# Patient Record
Sex: FEMALE | Race: WHITE | Employment: FULL TIME | URBAN - METROPOLITAN AREA
[De-identification: names, ages, dates, MRNs, and addresses within clinical notes are randomized per-mention and may not be internally consistent; named-entity substitution may affect disease eponyms.]

---

## 2021-01-25 ENCOUNTER — THERAPY VISIT (OUTPATIENT)
Dept: PHYSICAL THERAPY | Facility: CLINIC | Age: 31
End: 2021-01-25
Payer: COMMERCIAL

## 2021-01-25 DIAGNOSIS — M76.32 ILIOTIBIAL BAND SYNDROME, LEFT: ICD-10-CM

## 2021-01-25 DIAGNOSIS — M25.551 HIP PAIN, RIGHT: ICD-10-CM

## 2021-01-25 DIAGNOSIS — M76.31 ILIOTIBIAL BAND SYNDROME, RIGHT: ICD-10-CM

## 2021-01-25 PROCEDURE — 97161 PT EVAL LOW COMPLEX 20 MIN: CPT | Mod: GP | Performed by: PHYSICAL THERAPIST

## 2021-01-25 PROCEDURE — 97110 THERAPEUTIC EXERCISES: CPT | Mod: GP | Performed by: PHYSICAL THERAPIST

## 2021-01-25 ASSESSMENT — ACTIVITIES OF DAILY LIVING (ADL)
STEPPING_UP_AND_DOWN_CURBS: NO DIFFICULTY AT ALL
GOING_UP_1_FLIGHT_OF_STAIRS: NO DIFFICULTY AT ALL
HOS_ADL_ITEM_SCORE_TOTAL: 66
WALKING_APPROXIMATELY_10_MINUTES: NO DIFFICULTY AT ALL
WALKING_DOWN_STEEP_HILLS: NO DIFFICULTY AT ALL
GETTING_INTO_AND_OUT_OF_A_BATHTUB: NO DIFFICULTY AT ALL
WALKING_UP_STEEP_HILLS: NO DIFFICULTY AT ALL
ROLLING_OVER_IN_BED: NO DIFFICULTY AT ALL
HEAVY_WORK: NO DIFFICULTY AT ALL
SITTING_FOR_15_MINUTES: NO DIFFICULTY AT ALL
WALKING_15_MINUTES_OR_GREATER: NO DIFFICULTY AT ALL
TWISTING/PIVOTING_ON_INVOLVED_LEG: MODERATE DIFFICULTY
LIGHT_TO_MODERATE_WORK: NO DIFFICULTY AT ALL
WALKING_INITIALLY: NO DIFFICULTY AT ALL
HOS_ADL_COUNT: 17
GETTING_INTO_AND_OUT_OF_AN_AVERAGE_CAR: NO DIFFICULTY AT ALL
HOS_ADL_SCORE(%): 97.06
GOING_DOWN_1_FLIGHT_OF_STAIRS: NO DIFFICULTY AT ALL
RECREATIONAL_ACTIVITIES: NO DIFFICULTY AT ALL
HOS_ADL_HIGHEST_POTENTIAL_SCORE: 68
DEEP_SQUATTING: NO DIFFICULTY AT ALL
HOW_WOULD_YOU_RATE_YOUR_CURRENT_LEVEL_OF_FUNCTION_DURING_YOUR_USUAL_ACTIVITIES_OF_DAILY_LIVING_FROM_0_TO_100_WITH_100_BEING_YOUR_LEVEL_OF_FUNCTION_PRIOR_TO_YOUR_HIP_PROBLEM_AND_0_BEING_THE_INABILITY_TO_PERFORM_ANY_OF_YOUR_USUAL_DAILY_ACTIVITIES?: 100
PUTTING_ON_SOCKS_AND_SHOES: NO DIFFICULTY AT ALL
STANDING_FOR_15_MINUTES: NO DIFFICULTY AT ALL

## 2021-01-25 NOTE — LETTER
[facility] PH[facility]ICAL [facility]  [address] [facility]. [address]  [facility] [id number] [phone]    2021    Re: Erlinda Villegas   :   1990  MRN:  [id number]   REFERRING PHYSICIAN:   Tere Lau    [facility] [place] of Initial Evaluation:  2021  Visits:  Rxs Used: 1  Reason for Referral:     Hip pain, right  Iliotibial band syndrome, right  Iliotibial band syndrome, left    EVALUATION SUMMARY    [facility] Initial Evaluation  Subjective:  The history is provided by the patient. No  was used.   Patient Health History  Erlinda Villegas being seen for B hip/knee/leg pain.   Date of Onset: 21 MD order for PT.   Problem occurred: reports onset of B hip/knee pain on/off for 4 yrs. She c/o increased R>L posterior/lateral knee/ anterior/lat hip pain in 2020(maybe related to decreased exercise) Currently c/o R posterior/lateral knee pain and anterior lateral hip pain.( L leg much better in past 2 wks- no pain today) Pain worsens with prolonged sitting, better with exercise/stretching.    Pain is reported as 4/10 on pain scale.  General health as reported by patient is good.  Pertinent medical history includes: depression.   Red flags:  None as reported by patient.  Medical allergies: none.   Surgeries include:  None.    Current medications:  None.    Current occupation is teacher .      Objective:  Flexibility/Screens:   Lower Extremity:  Decreased left lower extremity flexibility:Piriformis and IT Band  Decreased right lower extremity flexibility:  Piriformis and IT Band      Lumbar/SI Evaluation  ROM:    AROM Lumbar:   Flexion:          Hands to floor NE during or after  Ext:                    Min loss NE during or after   Re: Erlinda Villegas   :   1990      Side Bend:        Left:  WNL pain free    Right:  Increased R glut+ during no worse  Rotation:            Left:     Right:   Side Glide:        Left:     Right:   Strength: slouched sitting posture  Lumbar Myotomes:  normal  Neural Tension/Mobility:  Lumbar:  Normal    Lumbar Palpation:    Tenderness present at Right: Piriformis and Gluteus Medius    Hip Evaluation  HIP AROM:  AROM:    Left Hip:     Normal    Right Hip:   Normal  Hip PROM:  Hip PROM:  Left Hip:    Normal  Right Hip:  Normal  Hip Strength:    Flexion:   Left: 5/5   -  Pain:  Right: 5-/5   +/-  Pain:  Abduction:  Left: 5/5    -   Pain:Right: 4/5   +   Pain:    Knee Evaluation:  ROM:  AROM: normal  PROM: normal  Strength:  Normal  Palpation:  Normal    Elisa Lumbar Evaluation  Test Movements:  EIL:   Repeat EIL: During: no effect  After: no effect  Mechanical Response: IncROM    Assessment/Plan:    Patient is a 30 year old female with both sides hip complaints.    Patient has the following significant findings with corresponding treatment plan.                Diagnosis 1:  B lateral hip/knee pain R>L, ITB syndrome  Pain -  hot/cold therapy, manual therapy, self management, education, directional preference exercise and home program  Decreased ROM/flexibility - manual therapy, therapeutic exercise and home program  Decreased strength - therapeutic exercise, therapeutic activities and home program  Impaired muscle performance - neuro re-education and home program  Decreased function - therapeutic activities and home program  Impaired posture - neuro re-education and home program    Therapy Evaluation Codes:   1) History comprised of:   Personal factors that impact the plan of care:      None.    Comorbidity factors that impact the plan of care are:      None.     Medications impacting care: None.      Re: Erlinda Villegas   :   1990          2) Examination of Body Systems comprised of:   Body structures and functions that impact the plan of care:      Hip.   Activity limitations that impact the plan of care are:      Sitting and  Standing.  3) Clinical presentation characteristics are:   Stable/Uncomplicated.  4) Decision-Making    Low complexity using standardized patient assessment instrument and/or measureable assessment of functional outcome.  Cumulative Therapy Evaluation is: Low complexity.  Previous and current functional limitations:  (See Goal Flow Sheet for this information)    Short term and Long term goals: (See Goal Flow Sheet for this information)   Communication ability:  Patient appears to be able to clearly communicate and understand verbal and written communication and follow directions correctly.  Treatment Explanation - The following has been discussed with the patient:   RX ordered/plan of care  Anticipated outcomes  Possible risks and side effects  This patient would benefit from PT intervention to resume normal activities.   Rehab potential is good.    Frequency:  1 X week, once daily  Duration:  for 6 weeks  Discharge Plan:  Achieve all LTG.  Independent in home treatment program.  Reach maximal therapeutic benefit.    Thank you for your referral.      INQUIRIES  Therapist: Farida Amato, PT  [facility] PHYSICAL THERAPY  [address] [place] [address]. [id number]  [facility] [id number]  Phone: [phone]  Fax: [phone]

## 2021-01-25 NOTE — PROGRESS NOTES
[facility] Initial Evaluation  Subjective:  The history is provided by the patient. No  was used.   Patient Health History  Erlinda Villegas being seen for B hip/knee/leg pain.     Date of Onset: 1/11/21 MD order for PT.   Problem occurred: reports onset of B hip/knee pain on/off for 4 yrs. She c/o increased R>L posterior/lateral knee/ anterior/lat hip pain in 11/2020(maybe related to decreased exercise) Currently c/o R posterior/lateral knee pain and anterior lateral hip pain.( L leg much better in past 2 wks- no pain today) Pain worsens with prolonged sitting, better with exercise/stretching.    Pain is reported as 4/10 on pain scale.  General health as reported by patient is good.  Pertinent medical history includes: depression.   Red flags:  None as reported by patient.  Medical allergies: none.   Surgeries include:  None.    Current medications:  None.    Current occupation is teacher .                                       Objective:        Flexibility/Screens:       Lower Extremity:  Decreased left lower extremity flexibility:Piriformis and IT Band    Decreased right lower extremity flexibility:  Piriformis and IT Band               Lumbar/SI Evaluation  ROM:    AROM Lumbar:   Flexion:          Hands to floor NE during or after  Ext:                    Min loss NE during or after   Side Bend:        Left:  WNL pain free    Right:  Increased R glut+ during no worse  Rotation:           Left:     Right:   Side Glide:        Left:     Right:         Strength: slouched sitting posture  Lumbar Myotomes:  normal                  Neural Tension/Mobility:  Lumbar:  Normal        Lumbar Palpation:      Tenderness present at Right: Piriformis and Gluteus Medius                                          Hip Evaluation  HIP AROM:  AROM:    Left Hip:     Normal    Right Hip:   Normal                  Hip PROM:  Hip PROM:  Left Hip:    Normal  Right Hip:  Normal                           Hip Strength:    Flexion:   Left: 5/5   -  Pain:  Right: 5-/5   +/-  Pain:                      Abduction:  Left: 5/5    -   Pain:Right: 4/5   +   Pain:                           Knee Evaluation:  ROM:  AROM: normal  PROM: normal  Strength:  Normal                Palpation:  Normal                  Elisa Lumbar Evaluation        Test Movements:        EIL:   Repeat EIL: During: no effect  After: no effect  Mechanical Response: IncROM                                                 ROS    Assessment/Plan:    Patient is a 30 year old female with both sides hip complaints.    Patient has the following significant findings with corresponding treatment plan.                Diagnosis 1:  B lateral hip/knee pain R>L, ITB syndrome  Pain -  hot/cold therapy, manual therapy, self management, education, directional preference exercise and home program  Decreased ROM/flexibility - manual therapy, therapeutic exercise and home program  Decreased strength - therapeutic exercise, therapeutic activities and home program  Impaired muscle performance - neuro re-education and home program  Decreased function - therapeutic activities and home program  Impaired posture - neuro re-education and home program    Therapy Evaluation Codes:   1) History comprised of:   Personal factors that impact the plan of care:      None.    Comorbidity factors that impact the plan of care are:      None.     Medications impacting care: None.  2) Examination of Body Systems comprised of:   Body structures and functions that impact the plan of care:      Hip.   Activity limitations that impact the plan of care are:      Sitting and Standing.  3) Clinical presentation characteristics are:   Stable/Uncomplicated.  4) Decision-Making    Low complexity using standardized patient assessment instrument and/or measureable assessment of functional outcome.  Cumulative Therapy Evaluation is: Low complexity.    Previous and current functional limitations:  (See  Goal Flow Sheet for this information)    Short term and Long term goals: (See Goal Flow Sheet for this information)     Communication ability:  Patient appears to be able to clearly communicate and understand verbal and written communication and follow directions correctly.  Treatment Explanation - The following has been discussed with the patient:   RX ordered/plan of care  Anticipated outcomes  Possible risks and side effects  This patient would benefit from PT intervention to resume normal activities.   Rehab potential is good.    Frequency:  1 X week, once daily  Duration:  for 6 weeks  Discharge Plan:  Achieve all LTG.  Independent in home treatment program.  Reach maximal therapeutic benefit.    Please refer to the daily flowsheet for treatment today, total treatment time and time spent performing 1:1 timed codes.

## 2021-02-01 ENCOUNTER — THERAPY VISIT (OUTPATIENT)
Dept: PHYSICAL THERAPY | Facility: CLINIC | Age: 31
End: 2021-02-01
Payer: COMMERCIAL

## 2021-02-01 DIAGNOSIS — M76.32 ILIOTIBIAL BAND SYNDROME, LEFT: ICD-10-CM

## 2021-02-01 DIAGNOSIS — M25.551 HIP PAIN, RIGHT: ICD-10-CM

## 2021-02-01 DIAGNOSIS — M76.31 ILIOTIBIAL BAND SYNDROME, RIGHT: ICD-10-CM

## 2021-02-01 PROCEDURE — 97110 THERAPEUTIC EXERCISES: CPT | Mod: GP | Performed by: PHYSICAL THERAPIST

## 2021-02-01 PROCEDURE — 97530 THERAPEUTIC ACTIVITIES: CPT | Mod: GP | Performed by: PHYSICAL THERAPIST

## 2021-04-13 PROBLEM — M25.551 HIP PAIN, RIGHT: Status: RESOLVED | Noted: 2021-01-25 | Resolved: 2021-04-13

## 2021-04-13 PROBLEM — M76.32 ILIOTIBIAL BAND SYNDROME, LEFT: Status: RESOLVED | Noted: 2021-01-25 | Resolved: 2021-04-13

## 2021-04-13 PROBLEM — M76.31 ILIOTIBIAL BAND SYNDROME, RIGHT: Status: RESOLVED | Noted: 2021-01-25 | Resolved: 2021-04-13

## 2021-04-13 NOTE — PROGRESS NOTES
Subjective:  HPI  Physical Exam                    Objective:  System    Physical Exam    General     ROS  Assessment/Plan:    DISCHARGE REPORT    Progress reporting period is from 1/25/21 to 2/1/21 .     SUBJECTIVE  Subjective: Erlinda reports min R LE pain overall even when sitting and HEP going well.I ran 1 .5 miles and did weight training without any pain.   Current Pain level: 1/10            ;   ,     Patient has failed to return to therapy so current objective findings are unknown.  The subjective and objective information are from the last SOAP note on this patient.    OBJECTIVE         ASSESSMENT/PLAN  Updated problem list and treatment plan: Diagnosis 1: B ITB/ R LE pain    STG/LTGs have been met or progress has been made towards goals:    Assessment of Progress: The patient has not returned to therapy. Current status is unknown.  Self Management Plans:  Patient has been instructed in a home treatment program.  Patient  has been instructed in self management of symptoms.    Erlinda continues to require the following intervention to meet STG and LTG's: PT  The patient failed to complete scheduled/ordered appointments so current information is unknown.  We will discharge this patient from PT.    Recommendations:  DC    Please refer to the daily flowsheet for treatment today, total treatment time and time spent performing 1:1 timed codes.